# Patient Record
Sex: MALE | Race: WHITE
[De-identification: names, ages, dates, MRNs, and addresses within clinical notes are randomized per-mention and may not be internally consistent; named-entity substitution may affect disease eponyms.]

---

## 2018-04-10 ENCOUNTER — HOSPITAL ENCOUNTER (INPATIENT)
Dept: HOSPITAL 14 - H.ER | Age: 58
LOS: 3 days | Discharge: HOME | DRG: 810 | End: 2018-04-13
Attending: FAMILY MEDICINE | Admitting: FAMILY MEDICINE
Payer: MEDICAID

## 2018-04-10 VITALS — BODY MASS INDEX: 25.7 KG/M2

## 2018-04-10 DIAGNOSIS — I73.9: ICD-10-CM

## 2018-04-10 DIAGNOSIS — I61.0: Primary | ICD-10-CM

## 2018-04-10 DIAGNOSIS — I16.1: ICD-10-CM

## 2018-04-10 DIAGNOSIS — Z23: ICD-10-CM

## 2018-04-10 DIAGNOSIS — I16.0: ICD-10-CM

## 2018-04-10 DIAGNOSIS — K29.70: ICD-10-CM

## 2018-04-10 DIAGNOSIS — F20.9: ICD-10-CM

## 2018-04-10 DIAGNOSIS — R47.1: ICD-10-CM

## 2018-04-10 LAB
APTT BLD: 30.8 SECONDS (ref 25.6–37.1)
BASOPHILS # BLD AUTO: 0.1 K/UL (ref 0–0.2)
BASOPHILS NFR BLD: 2 % (ref 0–2)
BUN SERPL-MCNC: 15 MG/DL (ref 9–20)
CALCIUM SERPL-MCNC: 9 MG/DL (ref 8.4–10.2)
EOSINOPHIL # BLD AUTO: 0.1 K/UL (ref 0–0.7)
EOSINOPHIL NFR BLD: 2.3 % (ref 0–4)
ERYTHROCYTE [DISTWIDTH] IN BLOOD BY AUTOMATED COUNT: 13.7 % (ref 11.5–14.5)
GFR NON-AFRICAN AMERICAN: > 60
HDLC SERPL-MCNC: 27 MG/DL (ref 30–70)
HDLC SERPL-MCNC: 33 MG/DL (ref 30–70)
HGB BLD-MCNC: 12.9 G/DL (ref 12–18)
INR PPP: 1.1 (ref 0.9–1.2)
LDLC SERPL-MCNC: 70 MG/DL (ref 0–129)
LDLC SERPL-MCNC: 82 MG/DL (ref 0–129)
LYMPHOCYTES # BLD AUTO: 1.2 K/UL (ref 1–4.3)
LYMPHOCYTES NFR BLD AUTO: 20.6 % (ref 20–40)
MCH RBC QN AUTO: 28.6 PG (ref 27–31)
MCHC RBC AUTO-ENTMCNC: 33 G/DL (ref 33–37)
MCV RBC AUTO: 86.7 FL (ref 80–94)
MONOCYTES # BLD: 0.4 K/UL (ref 0–0.8)
MONOCYTES NFR BLD: 7.3 % (ref 0–10)
NEUTROPHILS # BLD: 4.1 K/UL (ref 1.8–7)
NEUTROPHILS NFR BLD AUTO: 67.8 % (ref 50–75)
NRBC BLD AUTO-RTO: 0 % (ref 0–0)
PLATELET # BLD: 123 K/UL (ref 130–400)
PMV BLD AUTO: 9.6 FL (ref 7.2–11.7)
PROTHROMBIN TIME: 12.7 SECONDS (ref 9.8–13.1)
RBC # BLD AUTO: 4.5 MIL/UL (ref 4.4–5.9)
WBC # BLD AUTO: 6 K/UL (ref 4.8–10.8)

## 2018-04-10 NOTE — CP.PCM.CON
History of Present Illness





- History of Present Illness


History of Present Illness: 





57yo M. PMHx HTN, schizophrenia, gastritis.  p/w 1 week of dysarthria.  Head CT 

show left basal ganglia ICH.





Review of Systems





- Review of Systems


All systems: reviewed and no additional remarkable complaints except





- Neurological


Neurological: Abnormal Speech





Past Patient History





- Past Social History


Alcohol: None


Drugs: Denies





- CARDIAC


Hx Cardiac Disorders: Yes





- ENDOCRINE/METABOLIC


Hx Endocrine Disorders: Yes





- GASTROINTESTINAL


Hx Gastritis: Yes





- PSYCHIATRIC


Hx Schizophrenia: Yes





Meds


Allergies/Adverse Reactions: 


 Allergies











Allergy/AdvReac Type Severity Reaction Status Date / Time


 


No Known Allergies Allergy   Verified 04/10/18 11:29














- Medications


Medications: 


 Current Medications





Nicardipine HCl (Cardene Iv Premix)  20 mg in 200 mls @ 50 mls/hr IV .Q4H ONE; 

5 MG/HR


   PRN Reason: Protocol


   Stop: 04/10/18 19:45


   Last Admin: 04/10/18 15:52 Dose:  50 mls/hr











Physical Exam





- Head Exam


Head Exam: ATRAUMATIC, NORMAL INSPECTION, NORMOCEPHALIC





- Eye Exam


Eye Exam: EOMI, Normal appearance, PERRL





- ENT Exam


ENT Exam: Mucous Membranes Moist, Normal Exam





- Neck Exam


Neck exam: Positive for: Normal Inspection





- Respiratory Exam


Respiratory Exam: Clear to Auscultation Bilateral, NORMAL BREATHING PATTERN





- Cardiovascular Exam


Cardiovascular Exam: REGULAR RHYTHM





- Neurological Exam


Neurological exam: Alert, CN II-XII Intact, Normal Gait, Oriented x3, Reflexes 

Normal


Additional comments: 





dysarthria





Results





- Vital Signs


Recent Vital Signs: 


 Last Vital Signs











Temp  97.8 F   04/10/18 15:21


 


Pulse  68   04/10/18 16:03


 


Resp  19   04/10/18 16:03


 


BP  146/106 H  04/10/18 16:03


 


Pulse Ox  98   04/10/18 15:55














- Labs


Result Diagrams: 


 04/10/18 12:22





 04/10/18 12:22


Labs: 


 Laboratory Results - last 24 hr











  04/10/18 04/10/18 04/10/18





  12:22 12:22 12:22


 


WBC   6.0 


 


RBC   4.50 


 


Hgb   12.9 


 


Hct   39.0 


 


MCV   86.7 


 


MCH   28.6 


 


MCHC   33.0 


 


RDW   13.7 


 


Plt Count   123 L 


 


MPV   9.6 


 


Neut % (Auto)   67.8 


 


Lymph % (Auto)   20.6 


 


Mono % (Auto)   7.3 


 


Eos % (Auto)   2.3 


 


Baso % (Auto)   2.0 


 


Neut # (Auto)   4.1 


 


Lymph # (Auto)   1.2 


 


Mono # (Auto)   0.4 


 


Eos # (Auto)   0.1 


 


Baso # (Auto)   0.1 


 


PT    12.7


 


INR    1.1


 


APTT    30.8


 


Sodium  142  


 


Potassium  3.8  


 


Chloride  98  


 


Carbon Dioxide  30  


 


Anion Gap  18  


 


BUN  15  


 


Creatinine  1.1  


 


Est GFR ( Amer)  > 60  


 


Est GFR (Non-Af Amer)  > 60  


 


Random Glucose  101  


 


Calcium  9.0  


 


Troponin I  < 0.0120  


 


Triglycerides  147  


 


Cholesterol  130  


 


LDL Cholesterol Direct  70  


 


HDL Cholesterol  27 L  


 


Alcohol, Quantitative  < 10  














Assessment & Plan


(1) ICH (intracerebral hemorrhage)


Assessment and Plan: 


57yo M. PMHx HTN, schizophrenia, gastritis.  p/w 1 week of dysarthria secondary 

to left basal ganglia ICH.





Neuro: only neurological symptom is dysarthria, neuro checks. repeat head ct in 

am.





Pulm: no acute issues, breathing spontaneously on room air





CV: blood pressure control, cardene gtt to maintain SBP<140





Hem: no acute issues





Renal: no acute issues, urine output wnl





Endo: no acute issues





GI: NPO, until dysphagia assessment.





ID: no acute issues





DVT proph - held with current bleed, SCD's


GI proph - not currently indicated


Code status - full code





Critical Care Time spent 35 minutes


Multi-disciplinary rounds were performed with house staff, nursing, speech 

therapy, respiratory therapy, pharmacy and nutrition with integrated input from 

the primary team/attending and other consulting services.  The documented time 

is cumulative and includes review of patient data/exams/labs/chart review and 

examination of the patient on rounds and throughout the day; time is exclusive 

of any procedures or teaching time.


Status: Acute

## 2018-04-10 NOTE — CP.PCM.PN
Subjective





- Date & Time of Evaluation


Date of Evaluation: 04/10/18


Time of Evaluation: 16:16





- Subjective


Subjective: 





called by ER MD 


small thalamic hemorrhage


not surgical lesion 


if anything changes reconsult





Objective





- Vital Signs/Intake and Output


Vital Signs (last 24 hours): 


 











Temp Pulse Resp BP Pulse Ox


 


 97.8 F   68   19   146/106 H  98 


 


 04/10/18 15:21  04/10/18 16:03  04/10/18 16:03  04/10/18 16:03  04/10/18 15:55











- Medications


Medications: 


 Current Medications





Nicardipine HCl (Cardene Iv Premix)  20 mg in 200 mls @ 50 mls/hr IV .Q4H ONE; 

5 MG/HR


   PRN Reason: Protocol


   Stop: 04/10/18 19:45


   Last Admin: 04/10/18 15:52 Dose:  50 mls/hr











- Labs


Labs: 


 





 04/10/18 12:22 





 04/10/18 12:22 





 











PT  12.7 Seconds (9.8-13.1)   04/10/18  12:22    


 


INR  1.1  (0.9-1.2)   04/10/18  12:22    


 


APTT  30.8 Seconds (25.6-37.1)   04/10/18  12:22

## 2018-04-10 NOTE — CT
PROCEDURE:  CT HEAD WITHOUT CONTRAST.



HISTORY:

dysarthria



COMPARISON:

Unenhanced head CT 11/01/2010 



TECHNIQUE:

Axial computed tomography images were obtained through the head/brain 

without intravenous contrast.  



Radiation dose:



Total exam DLP = 1092.22



MGy-cm.



This CT exam was performed using one or more of the following dose 

reduction techniques: Automated exposure control, adjustment of the 

mA and/or kV according to patient size, and/or use of iterative 

reconstruction technique.



FINDINGS:



HEMORRHAGE:

Small intraparenchymal hemorrhage is appreciate left basal ganglia 

with limited local edema.  The density is likely E subacute rather 

than acute in appearance overall.  Clinically correlate further.  

Subacute timeframe fits with the clinical history of dysarthria for 

almost 1 week. There is no significant mass effect although the 

hemorrhage does approach the lateral margins of the anterior body of 

the left lateral ventricle. Overall volume of the hemorrhagic 

collection is as follows: 2.0 x 1.4 x 2.6 cm (anteroposterior by 

transverse by superoinferior dimensions). 



BRAIN:

Otherwise, a moderate sized chronic lobar infarction is identified in 

the right frontal lobe with a chronic lacune identified the upper 

right basal ganglia posteriorly and possibly at the left thalamus 

also.  Further, white-matter appears lucent surrounding the lateral 

ventricles and also in the subcortical distribution potentially 

reflecting advanced chronic microangiopathy for a 58-year-old patient 

or other white-matter disorder. Corpus callosum does not appear 

affected.  Clinically correlate further. No suspicious extra-axial 

fluid collection is identified and imaging through the posterior 

fossa reflects an ectatic basilar artery and left vertebral artery 

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 

artery.



VENTRICLES:

Unremarkable. No hydrocephalus. 



CALVARIUM:

Unremarkable.



PARANASAL SINUSES:

Unremarkable as visualized. No significant inflammatory changes.



MASTOID AIR CELLS:

Unremarkable as visualized. No inflammatory changes.



OTHER FINDINGS:

None.



IMPRESSION:

1. For mild intraparenchymal hemorrhage identified the left basal 

ganglia measure 2.6 cm greatest dimension with limited local edema 

surrounding the periphery. The level of density suggests probable 

subacute timeframe.  Clinically correlate further. No significant 

mass effect this time.  Please see discussion above. 



2. Relatively prominent chronic microangiopathy type white-matter 

lucency pattern appear relatively advanced for age of the patient.  

Clinically correlate for potential additional white matter 

etiologies. 



3. Moderate from chronic lobar infarction. Chronic lacune upper right 

basal ganglia and possibly tiny lacune left thalamus.



Findings discussed with Dr. Guy 04/10/2018 1:35 p.m. with 

written down and read back verification.

## 2018-04-10 NOTE — ED PDOC
HPI: Neurologic





- General


Time Seen by Provider: 04/10/18 11:39


Chief Complaint (Nursing): Weakness/Neurological Deficit


Chief Complaint (Provider): Difficulty speaking


Source: patient


Exam Limitations: no limitations





- History of Present Illness


Timing/Duration: 1 week


Associated Symptoms: other (difficulty with speech).  denies: fever/chills, 

nausea/vomiting, numbness in legs/feet, weakness


Allergies/Adverse Reactions: 


Allergies





No Known Allergies Allergy (Verified 04/10/18 11:29)


 








Home Medications: 


Ambulatory Orders





Atenolol [Tenormin] 50 mg PO DAILY 09/08/15 


Diphenhydramine HCl [Diphenhydramine] 100 mg PO HS 09/08/15 


Valsartan/Hydrochlorothiazide [Valsartan and Hydrochlorothiazide 12.5 mg-80 ] 1 

tab PO DAILY 09/08/15 


Escitalopram [Lexapro] 20 mg PO DAILY 04/10/18 


Multivitamin [Multi-Vitamin Daily] 1 tab PO DAILY 04/10/18 


Olanzapine [Zyprexa] 15 mg PO HS 04/10/18 


Vitamin E [Vitamin E 400 Units Cap] 400 unit PO DAILY 04/10/18 








Additional Complaint(s): 





Jaden Paz is a 58 year old male, with a past medical history of HTN, 

gastritis and schizophrenia, who presents to the emergency department 

accompanied by family members for difficulty with speech onset for x1 week. 

Patient doesn't know and didn't think too much of symptoms but was prompted to 

the ED by family members. He denies any headache, weakness or numbness, gait 

problems, fever, chills, nausea, vomit, or diarrhea. No further medical 

complaints.





PMD: Katie Nuñez





NIHSS Stroke Scale





- Date/Time Evaluation Performed


Date Performed: 04/10/18


Time Performed: 11:40


When Was NIHSS Performed: 7-10 days





- How Severe is the Stroke


Level of Consciousness: 0=Alert


LOC to Questions: 0=Both comments correct


LOC to commands: 0=Obeys both correctly


Best Gaze: 0=Normal


Visual: 0=No visual loss


Facial: 1=Minor asymmetry


Motor Arm - Left: 0=No drift


Motor Arm - Right: 0=No drift


Motor Leg - Left: 0=No drift


Motor Leg - Right: 0=No drift


Limb Ataxia: 0=Absent


Sensory: 0=Normal


Best Language: 0=No aphasia


Dysarthia: 1=Mild to moderate slurring


Extinction & Inattention (Neglect): 0=Normal, no object


Score: 2





rTPA Inclusion/Exclusion





- Refusal of Treatment


Patient Refused Treatment: No





- Inclusion Criteria for Altepase


Patient is 18 years or Older: Yes


The Clinical Diagnosis of Ischemic Stroke That is Causing a Potentially 

Disabling Neurological Deficit: No


Time of Onset is Well Established to be Less Than 270 Minute Before Treatment 

Would Begin: No


Risk/Benefit Discussed With Patient/Family Member Present: No





- Exclusion Criteria for Altepase


Evidence of an Intracranial Hemorrhage: Yes





Past Medical History


Reviewed: Historical Data, Nursing Documentation, Vital Signs


Vital Signs: 





 Last Vital Signs











Temp  97.4 F L  04/10/18 11:22


 


Pulse  60   04/10/18 11:22


 


Resp  22   04/10/18 11:22


 


BP  171/101 H  04/10/18 11:22


 


Pulse Ox  98   04/10/18 11:22














- Medical History


PMH: Gastritis, HTN, Schizophrenia





- Surgical History


Surgical History: No Surg Hx





- Family History


Family History: States: No Known Family Hx





- Social History


Current smoker - smoking cessation education provided: No


Alcohol: None


Drugs: Denies





- Home Medications


Home Medications: 


 Ambulatory Orders











 Medication  Instructions  Recorded


 


Atenolol [Tenormin] 50 mg PO DAILY 09/08/15


 


Diphenhydramine HCl 100 mg PO HS 09/08/15





[Diphenhydramine]  


 


Valsartan/Hydrochlorothiazide 1 tab PO DAILY 09/08/15





[Valsartan and Hydrochlorothiazide  





12.5 mg-80 ]  


 


Escitalopram [Lexapro] 20 mg PO DAILY 04/10/18


 


Multivitamin [Multi-Vitamin Daily] 1 tab PO DAILY 04/10/18


 


Olanzapine [Zyprexa] 15 mg PO HS 04/10/18


 


Vitamin E [Vitamin E 400 Units Cap] 400 unit PO DAILY 04/10/18














- Allergies


Allergies/Adverse Reactions: 


 Allergies











Allergy/AdvReac Type Severity Reaction Status Date / Time


 


No Known Allergies Allergy   Verified 04/10/18 11:29














Review of Systems


ROS Statement: Except As Marked, All Systems Reviewed And Found Negative


Constitutional: Negative for: Fever, Chills


Gastrointestinal: Negative for: Nausea, Vomiting, Diarrhea


Neurological: Positive for: Change in Speech (difficulty with speech).  

Negative for: Weakness, Numbness, Headache, Other (gait problems)





Physical Exam





- Reviewed


Nursing Documentation Reviewed: Yes


Vital Signs Reviewed: Yes





- Physical Exam


Appears: Positive for: Non-toxic, No Acute Distress


Head Exam: Positive for: ATRAUMATIC, NORMAL INSPECTION, NORMOCEPHALIC


Skin: Positive for: Normal Color, Warm, Dry


Eye Exam: Positive for: Normal appearance, EOMI, PERRL


ENT: Positive for: Normal ENT Inspection


Neck: Positive for: Painless ROM, Supple


Cardiovascular/Chest: Positive for: Regular Rate, Rhythm.  Negative for: Murmur


Respiratory: Positive for: Normal Breath Sounds.  Negative for: Respiratory 

Distress


Gastrointestinal/Abdominal: Positive for: Normal Exam, Soft.  Negative for: 

Tenderness, Guarding, Rebound


Extremity: Positive for: Normal ROM (upper and lower extremities.).  Negative 

for: Tenderness, Deformity, Swelling


Neurologic/Psych: Positive for: Alert, CNs II-XII (facial assymetry), Oriented (

x3), Gait (steady), Facial Droop (mild ).  Negative for: Motor/Sensory Deficits 

(normal on arms and legs)





- Laboratory Results


Result Diagrams: 


 04/11/18 04:45





 04/11/18 04:45





- ECG


O2 Sat by Pulse Oximetry: 98 (RA)


Pulse Ox Interpretation: Normal





- Critical Care


Total Time (In Min): 30





Medical Decision Making


Medical Decision Making: 





Initial Impression: Dysarthria, CVA, hemorrhagic CVA. 





Initial Plan: Stroke protocol work up





--Head w/o Contrast [CT]


--EKG


--Alcohol serum


--BMP


--Drug screen, urine


--Lipid Panel


--Troponin I


--CBC w/ differential


--PTT


--PT


--Reevaluation





13:49


Head CT





FINDINGS:





HEMORRHAGE:


Small intraparenchymal hemorrhage is appreciate left basal ganglia with limited 

local edema.  The density is likely E subacute rather than acute in appearance 

overall.  Clinically correlate further.  Subacute timeframe fits with the 

clinical history of dysarthria for almost 1 week. There is no significant mass 

effect although the hemorrhage does approach the lateral margins of the 

anterior body of the left lateral ventricle. Overall volume of the hemorrhagic 

collection is as follows: 2.0 x 1.4 x 2.6 cm (anteroposterior by transverse by 

superoinferior dimensions). 





BRAIN:


Otherwise, a moderate sized chronic lobar infarction is identified in the right 

frontal lobe with a chronic lacune identified the upper right basal ganglia 

posteriorly and possibly at the left thalamus also.  Further, white-matter 

appears lucent surrounding the lateral ventricles and also in the subcortical 

distribution potentially reflecting advanced chronic microangiopathy for a 58-

year-old patient or other white-matter disorder. Corpus callosum does not 

appear affected.  Clinically correlate further. No suspicious extra-axial fluid 

collection is identified and imaging through the posterior fossa reflects an 

ectatic basilar artery and left vertebral artery once again with likely 

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





VENTRICLES:


Unremarkable. No hydrocephalus. 





CALVARIUM:


Unremarkable.





PARANASAL SINUSES:


Unremarkable as visualized. No significant inflammatory changes.





MASTOID AIR CELLS:


Unremarkable as visualized. No inflammatory changes.





OTHER FINDINGS:


None.





IMPRESSION:


1. For mild intraparenchymal hemorrhage identified the left basal ganglia 

measure 2.6 cm greatest dimension with limited local edema surrounding the 

periphery. The level of density suggests probable subacute timeframe.  

Clinically correlate further. No significant mass effect this time.  Please see 

discussion above. 





2. Relatively prominent chronic microangiopathy type white-matter lucency 

pattern appear relatively advanced for age of the patient.  Clinically 

correlate for potential additional white matter etiologies. 





3. Moderate from chronic lobar infarction. Chronic lacune upper right basal 

ganglia and possibly tiny lacune left thalamus.





Findings discussed with Dr. Guy 04/10/2018 1:35 p.m. with written down and 

read back verification.











-Spoke with Dr. Guerrier for admission and wants Dr. Branch from neurology and 

will also consult with neurosurgeon.








-Spoke with Dr. Branch who advised to control the HTN with Nicardipine. TPA in 

contraindicated and not a candidate as well. 


ASA is contraindicated due to ICH.





-Discussed with Dr. Gary who states no neurosurgery treatment or 

evaluation is indicated for this patient. 








15:40


-Spoke with intensivist, Dr. Gautam, who accepted patient to ICU for 

uncontrolled blood pressure. 





--------------------------------------------------------------------------------

-----------------


Scribe Attestation:


Documented by Chicho lAmanza, acting as a scribe for Fazal Guy PA-C





Provider Scribe Attestation:


All medical record entries made by the Scribe were at my direction and 

personally dictated by me. I have reviewed the chart and agree that the record 

accurately reflects my personal performance of the history, physical exam, 

medical decision making, and the department course for this patient. I have 

also personally directed, reviewed, and agree with the discharge instructions 

and disposition.





Disposition





- Clinical Impression


Clinical Impression: 


 ICH (intracerebral hemorrhage)








- Patient ED Disposition


Is Patient to be Admitted: Yes


Discussed With : David Guerrier


Doctor Will See Patient In The: Hospital





- Disposition


Disposition Time: 14:45


Condition: GUARDED





- Pt Status Changed To:


Hospital Disposition Of: Inpatient





- Admit Certification


Admit to Inpatient:: After my assessment, the patient will require 

hospitalization for at least two midnights.  This is because of the severity of 

symptoms shown, intensity of services needed, and/or the medical risk in this 

patient being treated as an outpatient.





- POA


Present On Arrival: None

## 2018-04-11 LAB
ALBUMIN SERPL-MCNC: 3.9 G/DL (ref 3.5–5)
ALBUMIN/GLOB SERPL: 1.2 {RATIO} (ref 1–2.1)
ALT SERPL-CCNC: 32 U/L (ref 21–72)
AST SERPL-CCNC: 24 U/L (ref 17–59)
BUN SERPL-MCNC: 17 MG/DL (ref 9–20)
CALCIUM SERPL-MCNC: 9 MG/DL (ref 8.4–10.2)
ERYTHROCYTE [DISTWIDTH] IN BLOOD BY AUTOMATED COUNT: 13.7 % (ref 11.5–14.5)
GFR NON-AFRICAN AMERICAN: > 60
HGB BLD-MCNC: 13.6 G/DL (ref 12–18)
MCH RBC QN AUTO: 28.4 PG (ref 27–31)
MCHC RBC AUTO-ENTMCNC: 32.6 G/DL (ref 33–37)
MCV RBC AUTO: 87.1 FL (ref 80–94)
PLATELET # BLD: 124 K/UL (ref 130–400)
RBC # BLD AUTO: 4.79 MIL/UL (ref 4.4–5.9)
WBC # BLD AUTO: 6.9 K/UL (ref 4.8–10.8)

## 2018-04-11 PROCEDURE — 3E0234Z INTRODUCTION OF SERUM, TOXOID AND VACCINE INTO MUSCLE, PERCUTANEOUS APPROACH: ICD-10-PCS | Performed by: FAMILY MEDICINE

## 2018-04-11 NOTE — CP.PCM.HP
<Dhiraj Atkins - Last Filed: 04/11/18 12:44>





History of Present Illness





- History of Present Illness


History of Present Illness: 





CC: slurred speech





HPI: 59 y/o man w/ pmh of HTN, gastritis, and schizophrenia, presents to the ED 

w/ sluured speech.  Patient brought by family members for difficulty with 

speech onset for x1 week.  Patient unaware of slurring speech but family was 

concerned.  Patient denies headache, weakness or numbness, gait problems, fever

, chills, chest pain, SOB, abdominal pain, nausea, vomiting, diarrhea, or 

dysuria.  The patient has no further medical complaints.  Mother at bedside 

reports that son's face may be asymmetrical at baseline due to poor dentition.





PMD: Dr. Katie Nuñez


PMH: HTN, gastritis, and schizophrenia


meds: see med list


Allergies: NKDA


PSH: denies


Fam: denies


SOC: denies smoking, alcohol, and drugs





ROS: 12 points assessed and negative unless otherwise reported in HPI








Present on Admission





- Present on Admission


Any Indicators Present on Admission: No


History of DVT/PE: No


History of Uncontrolled Diabetes: No


Urinary Catheter: No


Decubitus Ulcer Present: No





Review of Systems





- Review of Systems


All systems: reviewed and no additional remarkable complaints except





- Constitutional


Constitutional: absent: Chills, Fever, Headache





- EENT


Eyes: absent: Change in Vision





- Cardiovascular


Cardiovascular: absent: Chest Pain, Palpitations





- Respiratory


Respiratory: absent: Cough, Dyspnea, Hemoptysis, Wheezing





- Gastrointestinal


Gastrointestinal: absent: Abdominal Pain, Diarrhea, Nausea, Vomiting





- Genitourinary


Genitourinary: absent: Dysuria





- Integumentary


Integumentary: absent: Rash





Past Patient History





- Past Social History


Alcohol: None


Drugs: Denies





- CARDIAC


Hx Cardiac Disorders: Yes





- NEUROLOGICAL


Other/Comment: schizophrenia





- ENDOCRINE/METABOLIC


Hx Endocrine Disorders: Yes





- MUSCULOSKELETAL/RHEUMATOLOGICAL


Hx Falls: No





- GASTROINTESTINAL


Hx Gastritis: Yes





- PSYCHIATRIC


Hx Schizophrenia: Yes





Meds


Allergies/Adverse Reactions: 


 Allergies











Allergy/AdvReac Type Severity Reaction Status Date / Time


 


No Known Allergies Allergy   Verified 04/10/18 11:29














Physical Exam





- Constitutional


Appears: Non-toxic, No Acute Distress





- Head Exam


Head Exam: ATRAUMATIC, NORMAL INSPECTION, NORMOCEPHALIC





- Eye Exam


Eye Exam: EOMI, Normal appearance, PERRL





- ENT Exam


ENT Exam: Mucous Membranes Moist





- Neck Exam


Neck exam: Positive for: Full Rom.  Negative for: Tenderness





- Respiratory Exam


Respiratory Exam: Clear to Auscultation Bilateral.  absent: Accessory Muscle Use

, Decreased Breath Sounds, Rales, Rhonchi, Wheezes, Respiratory Distress





- Cardiovascular Exam


Cardiovascular Exam: REGULAR RHYTHM.  absent: Tachycardia





- GI/Abdominal Exam


GI & Abdominal Exam: Normal Bowel Sounds, Soft.  absent: Distended, Tenderness





- Extremities Exam


Extremities exam: Negative for: calf tenderness, pedal edema, tenderness





- Neurological Exam


Neurological exam: Alert, CN II-XII Intact, Oriented x3





- Skin


Skin Exam: Dry, Intact, Normal Color, Warm





Results





- Vital Signs


Recent Vital Signs: 





 Last Vital Signs











Temp  98.7 F   04/11/18 12:00


 


Pulse  75   04/11/18 12:00


 


Resp  30 H  04/11/18 12:00


 


BP  119/82   04/11/18 12:00


 


Pulse Ox  97   04/11/18 12:00














- Labs


Result Diagrams: 


 04/11/18 04:45





 04/11/18 04:45


Labs: 





 Laboratory Results - last 24 hr











  04/10/18 04/10/18 04/11/18





  12:22 19:00 04:45


 


WBC    6.9


 


RBC    4.79


 


Hgb    13.6


 


Hct    41.7


 


MCV    87.1


 


MCH    28.4


 


MCHC    32.6 L


 


RDW    13.7


 


Plt Count    124 L


 


Sodium  142  


 


Potassium  3.8  


 


Chloride  98  


 


Carbon Dioxide  30  


 


Anion Gap  18  


 


BUN  15  


 


Creatinine  1.1  


 


Est GFR ( Amer)  > 60  


 


Est GFR (Non-Af Amer)  > 60  


 


Random Glucose  101  


 


Calcium  9.0  


 


Total Bilirubin   


 


AST   


 


ALT   


 


Alkaline Phosphatase   


 


Troponin I  < 0.0120  


 


Total Protein   


 


Albumin   


 


Globulin   


 


Albumin/Globulin Ratio   


 


Triglycerides  147  83  D 


 


Cholesterol  130  146 


 


LDL Cholesterol Direct  70  82 


 


HDL Cholesterol  27 L  33 


 


Alcohol, Quantitative  < 10  














  04/11/18





  04:45


 


WBC 


 


RBC 


 


Hgb 


 


Hct 


 


MCV 


 


MCH 


 


MCHC 


 


RDW 


 


Plt Count 


 


Sodium  146


 


Potassium  3.3 L


 


Chloride  104


 


Carbon Dioxide  26


 


Anion Gap  19


 


BUN  17


 


Creatinine  0.9


 


Est GFR ( Amer)  > 60


 


Est GFR (Non-Af Amer)  > 60


 


Random Glucose  98


 


Calcium  9.0


 


Total Bilirubin  1.2


 


AST  24


 


ALT  32


 


Alkaline Phosphatase  53


 


Troponin I 


 


Total Protein  7.1


 


Albumin  3.9


 


Globulin  3.3


 


Albumin/Globulin Ratio  1.2


 


Triglycerides 


 


Cholesterol 


 


LDL Cholesterol Direct 


 


HDL Cholesterol 


 


Alcohol, Quantitative 














Assessment & Plan


(1) ICH (intracerebral hemorrhage)


Status: Acute   





(2) HTN (hypertension)


Status: Chronic   





- Assessment and Plan (Free Text)


Plan: 





c/w present management


afebrile, non-tachycardic, normotensive


neurology recommendations appreciated; no antiplatelets for 3 weeks, can start 

baby aspirin in 3 weeks


neuro-surgery recommendations appreciated


cardiology consult ordered


EKG: sinus bradycardia, T wave depression/flattenning on anterolateral leads


CT head w/o 04/10/2018: showed left basal ganglia intracranial hemorrhage w/ 

chronic lacunar infarct


repeat Ct head w/o: stable left basal ganglia intracranial hemorrhage


MRA head w/o contrast: intact [place], there is hypoplastic distal 

right vertebral artery with widely patent ectatic distal left vertebral artery


f/u echo w/ bubble study


f/u speech/swallow eval


PT/OT ordered


monitor for acute changes





<David Guerrier - Last Filed: 04/11/18 21:33>





Results





- Vital Signs


Recent Vital Signs: 





 Last Vital Signs











Temp  98.2 F   04/11/18 20:00


 


Pulse  79   04/11/18 20:00


 


Resp  19   04/11/18 20:00


 


BP  140/68   04/11/18 20:00


 


Pulse Ox  97   04/11/18 20:00














- Labs


Result Diagrams: 


 04/11/18 04:45





 04/11/18 04:45


Labs: 





 Laboratory Results - last 24 hr











  04/11/18 04/11/18





  04:45 04:45


 


WBC  6.9 


 


RBC  4.79 


 


Hgb  13.6 


 


Hct  41.7 


 


MCV  87.1 


 


MCH  28.4 


 


MCHC  32.6 L 


 


RDW  13.7 


 


Plt Count  124 L 


 


Sodium   146


 


Potassium   3.3 L


 


Chloride   104


 


Carbon Dioxide   26


 


Anion Gap   19


 


BUN   17


 


Creatinine   0.9


 


Est GFR ( Amer)   > 60


 


Est GFR (Non-Af Amer)   > 60


 


Random Glucose   98


 


Calcium   9.0


 


Total Bilirubin   1.2


 


AST   24


 


ALT   32


 


Alkaline Phosphatase   53


 


Total Protein   7.1


 


Albumin   3.9


 


Globulin   3.3


 


Albumin/Globulin Ratio   1.2














Assessment & Plan





- Assessment and Plan (Free Text)


Plan: 


I was present during evaluation and discussed with Dr Atkins re plans of care 

and mgt.





David Guerrier M.D.

## 2018-04-11 NOTE — CT
PROCEDURE:  CT HEAD WITHOUT CONTRAST.



HISTORY:

ich



COMPARISON:

None available. 



TECHNIQUE:

Axial computed tomography images were obtained through the head/brain 

without intravenous contrast.  



Radiation dose:



Total exam DLP = 991.27 mGy-cm.



This CT exam was performed using one or more of the following dose 

reduction techniques: Automated exposure control, adjustment of the 

mA and/or kV according to patient size, and/or use of iterative 

reconstruction technique.



FINDINGS:



HEMORRHAGE:

Stable posterior left basal ganglia intraparenchymal hemorrhage is 

appreciated measuring 2.0 x 1.9 x 2.3 cm with local edema but no 

significant mass effect once again. No interval new hemorrhagic 

location is appreciated above or below the tentorium including the 

extra-axial spaces. 



BRAIN:

Chronic cystic encephalomalacia at the right frontal lobe is 

reiterated as well as diffuse cerebral white matter changes 

suggestive of chronic microangiopathy and left caudate head chronic 

lacune. No cortical edema throughout with brainstem and cerebellum 

remaining unremarkable diffusely. 



VENTRICLES:

Unremarkable. No hydrocephalus. 



CALVARIUM:

Unremarkable.



PARANASAL SINUSES:

Unremarkable as visualized. No significant inflammatory changes.



MASTOID AIR CELLS:

Unremarkable as visualized. No inflammatory changes.



OTHER FINDINGS:

None.



IMPRESSION:

Stable left basal ganglia intraparenchymal hemorrhage including local 

edema.  No interval mass effect is appreciated or new site of 

intracranial hemorrhage.  Chronic in cystic encephalomalacia right 

frontal lobe is reiterated as well as a chronic lacune at the 

anterior left basal ganglia. Likely a relatively prominent chronic 

microangiopathy reiterated.

## 2018-04-11 NOTE — CP.CCUPN
CCU Subjective





- Physician Review


Events Since Last Encounter (Free Text): 





04/11/18 13:52


speech still dysarthric.





CCU Objective





- Vital Signs / Intake & Output


Vital Signs (Last 4 hours): 


Vital Signs











  Temp Pulse Resp BP Pulse Ox


 


 04/11/18 12:00  98.7 F  75  30 H  119/82  97


 


 04/11/18 10:00   70  24  124/78  98











Intake and Output (Last 8hrs): 


 Intake & Output











 04/10/18 04/11/18 04/11/18





 22:59 06:59 14:59


 


Intake Total 295 50 


 


Output Total 225  


 


Balance 70 50 


 


Intake:   


 


   50 


 


Output:   


 


  Urine 225  


 


    Urine, Voided 225  














- Physical Exam


Head: Positive for: Atraumatic, Normocephalic


Pupils: Positive for: PERRL


Extroacular Muscles: Positive for: EOMI


Mouth: Positive for: Moist Mucous Membranes


Neck: Positive for: Normal Range of Motion


Respiratory/Chest: Positive for: Clear to Auscultation, Good Air Exchange


Cardiovascular: Positive for: Regular Rate and Rhythm


Abdomen: Positive for: Normal Bowel Sounds.  Negative for: Tenderness, 

Distention


Neurological: Positive for: GCS=15, Other (dysarthria)


Psychiatric: Positive for: Alert, Oriented x 3





- Medications


Active Medications: 


Active Medications











Generic Name Dose Route Start Last Admin





  Trade Name Freq  PRN Reason Stop Dose Admin


 


Pantoprazole Sodium  40 mg  04/11/18 09:00  04/11/18 08:53





  Protonix Inj  IVP   40 mg





  DAILY RACHEL   Administration


 


Valsartan  160 mg  04/11/18 13:45  





  Diovan  PO   





  DAILY RACHEL   














- Patient Studies


Lab Studies: 


 Lab Studies











  04/11/18 04/11/18 04/10/18 Range/Units





  04:45 04:45 19:00 


 


WBC   6.9   (4.8-10.8)  K/uL


 


RBC   4.79   (4.40-5.90)  Mil/uL


 


Hgb   13.6   (12.0-18.0)  g/dL


 


Hct   41.7   (35.0-51.0)  %


 


MCV   87.1   (80.0-94.0)  fl


 


MCH   28.4   (27.0-31.0)  pg


 


MCHC   32.6 L   (33.0-37.0)  g/dL


 


RDW   13.7   (11.5-14.5)  %


 


Plt Count   124 L   (130-400)  K/uL


 


Sodium  146    (132-148)  mmol/l


 


Potassium  3.3 L    (3.6-5.0)  MMOL/L


 


Chloride  104    ()  mmol/L


 


Carbon Dioxide  26    (22-30)  mmol/L


 


Anion Gap  19    (10-20)  


 


BUN  17    (9-20)  mg/dl


 


Creatinine  0.9    (0.8-1.5)  mg/dl


 


Est GFR ([place] Amer)  > 60    


 


Est GFR (Non-Af Amer)  > 60    


 


Random Glucose  98    ()  mg/dL


 


Calcium  9.0    (8.4-10.2)  mg/dL


 


Total Bilirubin  1.2    (0.2-1.3)  mg/dl


 


AST  24    (17-59)  U/L


 


ALT  32    (21-72)  U/L


 


Alkaline Phosphatase  53    ()  U/L


 


Total Protein  7.1    (6.3-8.2)  G/DL


 


Albumin  3.9    (3.5-5.0)  g/dL


 


Globulin  3.3    (2.2-3.9)  gm/dL


 


Albumin/Globulin Ratio  1.2    (1.0-2.1)  


 


Triglycerides    83  D  (0-149)  mg/DL


 


Cholesterol    146  (0-199)  mg/dL


 


LDL Cholesterol Direct    82  (0-129)  mg/dL


 


HDL Cholesterol    33  (30-70)  MG/DL








 Laboratory Results - last 24 hr











  04/10/18 04/11/18 04/11/18





  19:00 04:45 04:45


 


WBC   6.9 


 


RBC   4.79 


 


Hgb   13.6 


 


Hct   41.7 


 


MCV   87.1 


 


MCH   28.4 


 


MCHC   32.6 L 


 


RDW   13.7 


 


Plt Count   124 L 


 


Sodium    146


 


Potassium    3.3 L


 


Chloride    104


 


Carbon Dioxide    26


 


Anion Gap    19


 


BUN    17


 


Creatinine    0.9


 


Est GFR ( Amer)    > 60


 


Est GFR (Non-Af Amer)    > 60


 


Random Glucose    98


 


Calcium    9.0


 


Total Bilirubin    1.2


 


AST    24


 


ALT    32


 


Alkaline Phosphatase    53


 


Total Protein    7.1


 


Albumin    3.9


 


Globulin    3.3


 


Albumin/Globulin Ratio    1.2


 


Triglycerides  83  D  


 


Cholesterol  146  


 


LDL Cholesterol Direct  82  


 


HDL Cholesterol  33  














Review of Systems





- Review of Systems


All systems: reviewed and no additional remarkable complaints except (no 

complaints)





Critical Care Progress Note





- Nutrition


Nutrition: 


 Nutrition











 Category Date Time Status


 


 Heart Healthy Diet [DIET] Diets  04/11/18 Lunch Active














Assessment/Plan


(1) ICH (intracerebral hemorrhage)


Assessment and plan: 


59yo M. PMHx HTN, schizophrenia, gastritis.  p/w 1 week of dysarthria secondary 

to left basal ganglia ICH.





Neuro: only neurological symptom is dysarthria, neuro checks. repeat head ct 

showed no change.





Pulm: no acute issues, breathing spontaneously on room air





CV: blood pressure control, stopped cardene gtt, started back on oral anti-

hypertensives, higher dose valsartan 160mg.





Hem: no acute issues





Renal: no acute issues, urine output wnl





Endo: no acute issues





GI: low sodium diet





ID: no acute issues





DVT proph - held with current bleed, SCD's, patient is ambulatory


GI proph - not currently indicated


Code status - full code





Patient is stable for downgrade to the telemetry floors.





Critical Care Time spent 35 minutes


Multi-disciplinary rounds were performed with house staff, nursing, speech 

therapy, respiratory therapy, pharmacy and nutrition with integrated input from 

the primary team/attending and other consulting services.  The documented time 

is cumulative and includes review of patient data/exams/labs/chart review and 

examination of the patient on rounds and throughout the day; time is exclusive 

of any procedures or teaching time.


Current Visit: Yes   Status: Acute

## 2018-04-11 NOTE — PN
NEUROLOGY FOLLOWUP



DATE:  04/11/2018



CHIEF COMPLAINT:  Followup for status post intraparenchymal hemorrhage and

dysarthrias.



SUBJECTIVE:  The patient was seen and examined at bedside.  Still has some

dysarthria.  His MRI of the brain shows stable intraparenchymal basal

ganglia hemorrhage.  No significant mass effect.  Had some mild angiopathy

also seen.  His MRI of the head was unremarkable.  Currently, he is doing

much better.  His blood pressure medications have been adjusted _____ and

no acute events overnight.



PAST MEDICAL HISTORY:  Hypertension, gastritis and schizophrenia.



MEDICATIONS:  Reviewed by nurse per reconciliation sheet.



REVIEW OF SYSTEMS:  A 14-point review of systems is negative except in the

HPI.



FAMILY HISTORY:  Noncontributory.



PHYSICAL EXAMINATION

VITAL SIGNS:  Temperature 98.7, pulse rate 75, blood pressure 132/97,

respiratory rate 20 and oxygen saturations 96% by room air.

GENERAL:  The patient is sitting up in bed.  No acute distress.

HEENT:  Atraumatic and normocephalic.  PERRLA.  Extraocular muscles intact.

NECK:  Supple.  No JVD.  No adenopathy noted.

LUNGS:  Clear to auscultation.  No adventitious sounds.

HEART:  S1 and S2, normal rate and rhythm.  No murmurs, rubs or gallops.

ABDOMEN:  Soft, nontender and nondistended.  Bowel sounds present.

EXTREMITIES:  No clubbing.  No cyanosis.  Peripheral pulses 2+ bilaterally.

NEUROLOGIC:  The patient is alert and oriented to person, place, month, and

year.  Recall after 5 minutes is 0/3.  Poor attention span.  Slow thought

process.  Speech is dysarthric.  Cranial nerves II to XII intact.  Had some

mild right facial droop.  Motor exam; mild right-sided weakness with mild

right pronator drip when compared to the left.  Motor strength is intact. 

Sensory exam: light touch pinprick, proprioception, and vibration are

intact.  DTRs are 2+ throughout.  Coordination, finger-to-nose intact. 

There is some mild dysmetria on the finger-to-nose on the right due to mild

right-sided weakness.  Gait is deferred for now.



LABORATORY DATA:  Reviewed.



ASSESSMENT AND PLAN:  This is a 58-year-old man with history of

schizophrenia, hypertension, came in for dysarthria for past several week

with mild right-sided weakness and right facial droop and found to have

elevated systolic and diastolic blood pressures and found to have a left

intraparenchymal left basal ganglia hemorrhage which is secondary to

hypertensive urgency.



RECOMMENDATIONS:  At this time recommend;

1.  Keep blood pressure systolically to 120s to 130s and diastolic 70s and

80s.

2.  No anticoagulation for 3 weeks in the onset of hemorrhage.

3.  PT/OT assessment.  Continue on current present medical management.



Thank you for this followup.





__________________________________________

Jef Branch MD





DD:  04/11/2018 17:21:25

DT:  04/11/2018 18:03:53

Job # [id number]

## 2018-04-11 NOTE — MRI
PROCEDURE:  MRI BRAIN WITHOUT CONTRAST



HISTORY:

iCH



COMPARISON:

Unenhanced head CT 04/10/2018. 



TECHNIQUE:

Multiplanar, multisequence MR images of the brain were obtained 

without intravenous contrast enhancement.



FINDINGS:



BRAIN PARENCHYMA:

Left basal ganglia intraparenchymal hemorrhage is reiterated with 

likely extension to the subependymal margins at the left lateral 

ventricle as no layering of hemorrhage and anteriorly as seen in the 

left lateral ventricle body or atrium at this time. The overall 

pattern does not appear to have increased in size in the interval 

measuring a maximum of 2.3 cm greatest dimension which may not 

adequately translate to the CT pattern given differences and imaging 

technique. No prominent interval intracranial hemorrhage is 

identified and there remains no significant mass effect due to the 

acute or subacute hemorrhage.



However, there are multiple small foci of hemosiderin dephasing 

signal in the gradient echo axial series scattered throughout the 

cerebellum, also including the brainstem. There not predominate 

peripheral though number located in the periphery of the bilateral 

frontal parietal and temporal lobes, but also affect central brain 

including the bilateral thalami. No calcifications were seen 

associated with these foci in the prior CT examination and the 

pattern is felt to be a function of multiple cavernomata rather than 

amyloid angiopathy or neurocysticercosis.



Posttraumatic or post ischemic cystic encephalomalacia is reiterated 

at the right frontal lobe with relatively prominent subcortical and 

periventricular white matter changes sparing the corpus callosum in a 

pattern suggestive of likely chronic microangiopathy.  Minimal 

involvement of the jessenia is appreciated. The sulci and cisterns are 

unremarkable swells overall ventricular volume.



VENTRICLES:

Unremarkable. No hydrocephalus.



CRANIUM:

Unremarkable.



ORBITS:

Grossly unremarkable.



PARANASAL SINUSES/MASTOIDS:

Clear



VASCULAR SYSTEM:

Skull base flow voids intact.



OTHER FINDINGS:

None. 



IMPRESSION:

Likely stable intraparenchymal hemorrhage left basal ganglia with 

local edema but no significant mass effect at this time. Follow-up CT 

advised. 



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 

and brainstem and is favored over amyloid angiopathy or even 

neurocysticercosis. Posttraumatic changes from diffuse axonal injury 

is also not favored. Chronic cystic encephalomalacia right frontal 

lobe reiterated. 



Relatively prominent white matter changes suggestive of advanced 

chronic microangiopathy.  Clinically correlate nevertheless.

## 2018-04-11 NOTE — MRI
PROCEDURE:  Magnetic Resonance Angiography Brain



HISTORY:

ICH







COMPARISON:

None available. 



TECHNIQUE:

3D time of flight MR angiography of the intracranial arteries was 

performed. Rotating maximum intensity projection images were 

generated.



FINDINGS:



INTERNAL CAROTID ARTERIES:

Unremarkable. The skull base, petrous, cavernous and supraclinoid 

segments are bilaterally widely patient. 



ANTERIOR CEREBRAL ARTERIES:

Unremarkable. A1 and A2 segments are widely patent. Smaller distal 

branches unremarkable, as visualized.



MIDDLE CEREBRAL ARTERIES:

Unremarkable. M1 and M2 segments are widely patent. Perisylvian 

branches grossly symmetric.



POSTERIOR CIRCULATION:

Basilar Artery: Unremarkable.



Distal Vertebral Arteries: The distal right vertebral artery is 

likely markedly hypoplastic with a widely patent though ectatic 

distal left vertebral artery identified.



Posterior Cerebral Arteries: Unremarkable.



Posterior Inferior Cerebellar Arteries: Unremarkable.



ANEURYSM/ VASCULAR MALFORMATIONS:

None.



OTHER FINDINGS:

None. 



IMPRESSION:

Intact [place] major arteries as discussed above. No returns 

malformation or aneurysm or significant stenosis is identified.  Note 

is made of a likely markedly hypoplastic distal right vertebral 

artery with a widely patent though ectatic distal left vertebral 

artery evident.

## 2018-04-11 NOTE — CP.PCM.CON
History of Present Illness





- History of Present Illness


History of Present Illness: 





consulation for evaluation of cardioembolic source of CVA 





HPI:


58-year-old male with history of history of schizophrenia hypertension 

gastritis brought in by family member for evaluation of slurred speech going on 

for a week patient's mother and uncle at the bedside who gave most of the 

information as per the family member he has been having difficulty for speech 

for about a week for which family was concerned and brought him to the 

emergency room according to patient's mother he was okay until he and his 

teenage years when he went for a retraining and then since then had a left 

severe psychiatric disorders and schizophrenia and has been dependent on his 

family members at baseline he has limited activity but could have articulated 

and will verbalize his feelings but for the past week was unable to talk.  

Mother at bedside reports that son face is also noted to be asymmetrical due to 

poor dentition.  Past medical history as stated above significant for 

hypertension schizophrenia gastritis allergies no known drug allergies family 

history reviewed denied any history of premature CAD or early death social 

history denies history of smoking alcohol or illicit drug use.





Review of Systems





- Review of Systems


Systems not reviewed;Unavailable: Acuity of Condition





- Constitutional


Constitutional: As Per HPI





- EENT


Eyes: As Per HPI


Ears: As Per HPI


Nose/Mouth/Throat: As Per HPI





- Cardiovascular


Cardiovascular: As Per HPI





- Respiratory


Respiratory: As Per HPI





- Gastrointestinal


Gastrointestinal: As Per HPI





- Genitourinary


Genitourinary: As Per HPI





- Reproductive: Male


Reproductive:Male: As Per HPI





- Musculoskeletal


Musculoskeletal: As Per HPI





- Integumentary


Integumentary: As Per HPI





- Neurological


Neurological: As Per HPI





- Psychiatric


Psychiatric: As Per HPI





- Endocrine


Endocrine: As Per HPI





- Hematologic/Lymphatic


Hematologic: As Per HPI





Past Patient History





- Past Social History


Alcohol: None


Drugs: Denies





- CARDIAC


Hx Hypertension: Yes





- NEUROLOGICAL


Other/Comment: schizophrenia





- ENDOCRINE/METABOLIC


Hx Endocrine Disorders: Yes





- MUSCULOSKELETAL/RHEUMATOLOGICAL


Hx Falls: No





- GASTROINTESTINAL


Hx Gastritis: Yes





- PSYCHIATRIC


Hx Schizophrenia: Yes





Meds


Allergies/Adverse Reactions: 


 Allergies











Allergy/AdvReac Type Severity Reaction Status Date / Time


 


No Known Allergies Allergy   Verified 04/10/18 11:29














- Medications


Medications: 


 Current Medications





Pantoprazole Sodium (Protonix Inj)  40 mg IVP DAILY [facility]


   Last Admin: 04/11/18 08:53 Dose:  40 mg


Valsartan (Diovan)  160 mg PO DAILY [facility]


   Last Admin: 04/11/18 16:02 Dose:  160 mg











Physical Exam





- Constitutional


Appears: Well





- Head Exam


Head Exam: ATRAUMATIC, NORMAL INSPECTION, NORMOCEPHALIC





- Eye Exam


Eye Exam: EOMI, Normal appearance, PERRL


Pupil Exam: NORMAL ACCOMODATION, PERRL





- ENT Exam


ENT Exam: Mucous Membranes Moist, Normal Exam





- Neck Exam


Neck exam: Positive for: Normal Inspection





- Respiratory Exam


Respiratory Exam: Clear to Auscultation Bilateral, NORMAL BREATHING PATTERN





- Cardiovascular Exam


Cardiovascular Exam: REGULAR RHYTHM, RRR, +S1, +S2





- GI/Abdominal Exam


GI & Abdominal Exam: Normal Bowel Sounds, Soft.  absent: Tenderness





- Extremities Exam


Extremities exam: Positive for: normal inspection





- Back Exam


Back exam: NORMAL INSPECTION





- Neurological Exam


Neurological exam: Alert, Reflexes Normal





- Psychiatric Exam


Psychiatric exam: Flat Affect, Normal Mood





- Skin


Skin Exam: Dry, Intact, Normal Color, Warm





Results





- Vital Signs


Recent Vital Signs: 


 Last Vital Signs











Temp  98.2 F   04/11/18 20:00


 


Pulse  79   04/11/18 20:00


 


Resp  19   04/11/18 20:00


 


BP  140/68   04/11/18 20:00


 


Pulse Ox  97   04/11/18 20:00














- Labs


Result Diagrams: 


 04/11/18 04:45





 04/11/18 04:45


Labs: 


 Laboratory Results - last 24 hr











  04/11/18 04/11/18





  04:45 04:45


 


WBC  6.9 


 


RBC  4.79 


 


Hgb  13.6 


 


Hct  41.7 


 


MCV  87.1 


 


MCH  28.4 


 


MCHC  32.6 L 


 


RDW  13.7 


 


Plt Count  124 L 


 


Sodium   146


 


Potassium   3.3 L


 


Chloride   104


 


Carbon Dioxide   26


 


Anion Gap   19


 


BUN   17


 


Creatinine   0.9


 


Est GFR ( Amer)   > 60


 


Est GFR (Non-Af Amer)   > 60


 


Random Glucose   98


 


Calcium   9.0


 


Total Bilirubin   1.2


 


AST   24


 


ALT   32


 


Alkaline Phosphatase   53


 


Total Protein   7.1


 


Albumin   3.9


 


Globulin   3.3


 


Albumin/Globulin Ratio   1.2














Assessment & Plan


(1) CVA (cerebral vascular accident)


Assessment and Plan: 


echo to evaluation for R/L shunting


asa 


MRI brain


telemetry 


Status: Acute   





(2) ICH (intracerebral hemorrhage)


Status: Acute   





(3) HTN (hypertension)


Assessment and Plan: 


valsartan 


Status: Chronic

## 2018-04-11 NOTE — CARD
--------------- APPROVED REPORT --------------





EKG Measurement

Heart [id number]

[id number]

[id number]

[id number]

[id number]



<Conclusion>

Sinus bradycardia

T wave abnormality, consider anterolateral ischemia

Abnormal ECG

## 2018-04-12 LAB
BUN SERPL-MCNC: 26 MG/DL (ref 9–20)
CALCIUM SERPL-MCNC: 9.4 MG/DL (ref 8.4–10.2)
ERYTHROCYTE [DISTWIDTH] IN BLOOD BY AUTOMATED COUNT: 13.7 % (ref 11.5–14.5)
GFR NON-AFRICAN AMERICAN: > 60
HGB BLD-MCNC: 13.9 G/DL (ref 12–18)
MCH RBC QN AUTO: 28.8 PG (ref 27–31)
MCHC RBC AUTO-ENTMCNC: 33.1 G/DL (ref 33–37)
MCV RBC AUTO: 87.1 FL (ref 80–94)
PLATELET # BLD: 124 K/UL (ref 130–400)
RBC # BLD AUTO: 4.81 MIL/UL (ref 4.4–5.9)
WBC # BLD AUTO: 7.2 K/UL (ref 4.8–10.8)

## 2018-04-12 NOTE — CP.PCM.PN
Subjective





- Date & Time of Evaluation


Date of Evaluation: 04/12/18


Time of Evaluation: 13:52





- Subjective


Subjective: 


no events overnight


s/p MRI / MRA 





Objective





- Vital Signs/Intake and Output


Vital Signs (last 24 hours): 


 











Temp Pulse Resp BP Pulse Ox


 


 97.9 F   76   23   136/95 H  100 


 


 04/12/18 12:00  04/12/18 13:00  04/12/18 13:00  04/12/18 13:00  04/12/18 13:00








Intake and Output: 


 











 04/12/18 04/12/18





 06:59 18:59


 


Intake Total 360 


 


Output Total 750 


 


Balance -390 














- Medications


Medications: 


 Current Medications





Pantoprazole Sodium (Protonix Inj)  40 mg IVP DAILY [facility]


   Last Admin: 04/12/18 08:23 Dose:  40 mg


Valsartan (Diovan)  160 mg PO DAILY [facility]


   Last Admin: 04/12/18 08:23 Dose:  160 mg











- Labs


Labs: 


 





 04/12/18 04:45 





 04/12/18 04:45 





 











PT  12.7 Seconds (9.8-13.1)   04/10/18  12:22    


 


INR  1.1  (0.9-1.2)   04/10/18  12:22    


 


APTT  30.8 Seconds (25.6-37.1)   04/10/18  12:22    














- Constitutional


Appears: Well





- Head Exam


Head Exam: ATRAUMATIC, NORMAL INSPECTION, NORMOCEPHALIC





- Eye Exam


Eye Exam: EOMI, Normal appearance, PERRL


Pupil Exam: NORMAL ACCOMODATION, PERRL





- ENT Exam


ENT Exam: Mucous Membranes Moist, Normal Exam





- Neck Exam


Neck Exam: Full ROM, Normal Inspection.  absent: Lymphadenopathy





- Respiratory Exam


Respiratory Exam: Clear to Ausculation Bilateral, NORMAL BREATHING PATTERN





- Cardiovascular Exam


Cardiovascular Exam: REGULAR RHYTHM, +S1, +S2, Murmur





- GI/Abdominal Exam


GI & Abdominal Exam: Soft, Normal Bowel Sounds.  absent: Tenderness





- Extremities Exam


Extremities Exam: Full ROM, Normal Capillary Refill, Normal Inspection.  absent

: Joint Swelling, Pedal Edema





- Back Exam


Back Exam: NORMAL INSPECTION





- Neurological Exam


Neurological Exam: Alert, Awake





- Psychiatric Exam


Psychiatric exam: Normal Affect, Normal Mood





- Skin


Skin Exam: Dry, Intact, Normal Color, Warm





Assessment and Plan


(1) CVA (cerebral vascular accident)


Assessment & Plan: 


2' to cavernous hemangiomas on MRI 


avoid antiplatelet and AC 2' to ICH 


low dose statins


no further w/u warranted 


echo reviewed - no bubble crossover 


Status: Acute   





(2) ICH (intracerebral hemorrhage)


Status: Acute   





(3) HTN (hypertension)


Assessment & Plan: 


cont valsartan





Status: Chronic

## 2018-04-12 NOTE — CP.PCM.PN
Subjective





- Date & Time of Evaluation


Date of Evaluation: 04/12/18


Time of Evaluation: 09:00





- Subjective


Subjective: 





Patient seen and examined at bedside this morning w/ Dr. Guerrier.  There are no 

acute events overnight, NAD.  Patient denies headache, chest pain, SOB, change 

in speech, weakness, or numbness.  Mother is at bedside.  Patient seen by 

cardiologist.





Objective





- Vital Signs/Intake and Output


Vital Signs (last 24 hours): 


 











Temp Pulse Resp BP Pulse Ox


 


 97.9 F   81   26 H  138/95 H  100 


 


 04/12/18 12:00  04/12/18 14:00  04/12/18 14:00  04/12/18 14:00  04/12/18 14:00








Intake and Output: 


 











 04/12/18 04/12/18





 06:59 18:59


 


Intake Total 360 


 


Output Total 750 


 


Balance -390 














- Medications


Medications: 


 Current Medications





Pantoprazole Sodium (Protonix Inj)  40 mg IVP DAILY [facility]


   Last Admin: 04/12/18 08:23 Dose:  40 mg


Valsartan (Diovan)  160 mg PO DAILY [facility]


   Last Admin: 04/12/18 08:23 Dose:  160 mg











- Labs


Labs: 


 





 04/12/18 04:45 





 04/12/18 04:45 





 











PT  12.7 Seconds (9.8-13.1)   04/10/18  12:22    


 


INR  1.1  (0.9-1.2)   04/10/18  12:22    


 


APTT  30.8 Seconds (25.6-37.1)   04/10/18  12:22    














- Constitutional


Appears: Non-toxic, No Acute Distress





- Head Exam


Head Exam: ATRAUMATIC, NORMAL INSPECTION, NORMOCEPHALIC





- Eye Exam


Eye Exam: EOMI, Normal appearance


Pupil Exam: NORMAL ACCOMODATION, PERRL





- ENT Exam


ENT Exam: Mucous Membranes Moist





- Neck Exam


Neck Exam: Full ROM.  absent: Tenderness





- Respiratory Exam


Respiratory Exam: Clear to Ausculation Bilateral.  absent: Accessory Muscle Use

, Decreased Breath Sounds, Rales, Rhonchi, Wheezes, Respiratory Distress





- Cardiovascular Exam


Cardiovascular Exam: REGULAR RHYTHM.  absent: Tachycardia





- GI/Abdominal Exam


GI & Abdominal Exam: Soft, Normal Bowel Sounds.  absent: Distended, Tenderness





- Extremities Exam


Extremities Exam: absent: Calf Tenderness, Pedal Edema





- Neurological Exam


Neurological Exam: Alert, Awake, CN II-XII Intact, Oriented x3





- Skin


Skin Exam: Dry, Intact, Normal Color, Warm





Assessment and Plan


(1) ICH (intracerebral hemorrhage)


Status: Acute   





(2) HTN (hypertension)


Status: Chronic   





- Assessment and Plan (Free Text)


Plan: 





c/w present management


afebrile, non-tachycardic, normotensive


neurology recommendations appreciated


neuro-surgery recommendations appreciated


cardiology recommendations appreciated


EKG: sinus bradycardia, T wave depression/flattenning on anterolateral leads


CT head w/o 04/10/2018: showed left basal ganglia intracranial hemorrhage w/ 

chronic lacunar infarct


repeat CT head w/o: stable left basal ganglia intracranial hemorrhage


MRA head w/o contrast: intact [place], there is hypoplastic distal 

right vertebral artery with widely patent ectatic distal left vertebral artery


MRI brain: showed cavernous hemangioma 


echo w/ bubble study: EF 65-70% no bubble crossover


speech/swallow eval: may start PO w/ aspiration precautions, may benefit w/ 

speech therapy


c/w PT/OT 


monitor for acute changes


dispo pending acute rehab

## 2018-04-12 NOTE — CARD
--------------- APPROVED REPORT --------------





EXAM: Two-dimensional and M-mode echocardiogram with Doppler and 

color Doppler.



Other Information 

Quality : AverageRhythm : NSR



INDICATION

CVA/TIA 



Echo Enhancing Agent

Indication: Rule Out Septal Defect

Agent/Amount Used: Agitated Saline



2D DIMENSIONS 

IVSd0.89   (0.7-1.1cm)LVDd3.63   (3.9-5.9cm)

LVOT Diameter3.63   (1.8-2.4cm)PWd0.89   (0.7-1.1cm)

IVSs1.96   (0.8-1.2cm)LVDs2.43   (2.5-4.0cm)

FS (%) 33.0   %PWs1.96   (0.8-1.2cm)



M-Mode DIMENSIONS 

Left Atrium (MM)3.56   (2.5-4.0cm)IVSd1.09   (0.7-1.1cm)

Aortic Root4.50   (2.2-3.7cm)LVDd6.75   (4.0-5.6cm)

Aortic Cusp Exc.2.19   (1.5-2.0cm)PWd1.13   (0.7-1.1cm)

IVSs2.00   cmFS (%) 53   %

LVDs3.16   (2.0-3.8cm)PWs1.72   cm



Mitral Valve

MV E Ebmjjoik05.5cm/sMV DECEL [id number] A Gxddqnws81.4cm/s

MV VNY73urX/A ratio1.2MVA (PHT)3.17cm2



TDI

Lateral E' Peak V10.43cm/sMedial E' Peak V8.72cm/sE/Lateral E'6.0

E/Medial E'7.2



Pulmonary Valve

PV Peak Ypraujmm768.0cm/s



 LEFT VENTRICLE 

The left ventricle is normal size.

There is normal left ventricular wall thickness.

Left ventricle systolic function is normal.

The Ejection Fraction is 65-70%.

The basal 1/3rd of the inferior wall was hypokinetic

Remaining LV wall segments contracted normally

Transmitral Doppler flow pattern is Grade I-abnormal relaxation 

pattern.



 RIGHT VENTRICLE 

The right ventricle is normal size.

There is normal right ventricular wall thickness.

The right ventricular systolic function is normal.



 ATRIA 

The left atrium size is normal.

The right atrium size is normal.



 AORTIC VALVE 

The aortic valve is normal in structure.

No aortic regurgitation is present.

There is no aortic valvular stenosis. 



 MITRAL VALVE 

The mitral valve is normal in structure.

There is no evidence of mitral valve prolapse.

There is no mitral valve stenosis.

There is no mitral valve regurgitation noted.



 TRICUSPID VALVE 

The tricuspid valve is normal in structure.

There is no tricuspid valve regurgitation noted.



 PULMONIC VALVE 

The pulmonary valve is normal in structure.

There is no pulmonic valvular regurgitation. 



 GREAT VESSELS 

The aortic root is mildly to moderately enlarged.

The IVC is normal in size and collapses >50% with inspiration.



 PERICARDIAL EFFUSION 

The pericardium appears normal.



<Conclusion>

The left ventricle is normal size.

There is normal left ventricular wall thickness.

The basal 1/3rd of the inferior wall was hypokinetic

Remaining LV wall segments contracted normally

Left ventricle systolic function is normal.

The Ejection Fraction is 65-70%.

Transmitral Doppler flow pattern is Grade I-abnormal relaxation 

pattern.

The aortic root is mildly to moderately enlarged.

## 2018-04-13 VITALS — RESPIRATION RATE: 20 BRPM

## 2018-04-13 VITALS
TEMPERATURE: 97.9 F | OXYGEN SATURATION: 100 % | DIASTOLIC BLOOD PRESSURE: 85 MMHG | HEART RATE: 68 BPM | SYSTOLIC BLOOD PRESSURE: 149 MMHG

## 2018-04-13 LAB
BUN SERPL-MCNC: 26 MG/DL (ref 9–20)
CALCIUM SERPL-MCNC: 8.9 MG/DL (ref 8.4–10.2)
ERYTHROCYTE [DISTWIDTH] IN BLOOD BY AUTOMATED COUNT: 13.7 % (ref 11.5–14.5)
GFR NON-AFRICAN AMERICAN: > 60
HGB BLD-MCNC: 13.2 G/DL (ref 12–18)
MCH RBC QN AUTO: 28.3 PG (ref 27–31)
MCHC RBC AUTO-ENTMCNC: 32.2 G/DL (ref 33–37)
MCV RBC AUTO: 87.9 FL (ref 80–94)
PLATELET # BLD: 126 K/UL (ref 130–400)
RBC # BLD AUTO: 4.65 MIL/UL (ref 4.4–5.9)
WBC # BLD AUTO: 7.1 K/UL (ref 4.8–10.8)

## 2018-04-13 NOTE — CP.PCM.PCO
Assessment & Plan





- Assessment and Plan (Free Text)


Assessment: 





Patient seen and examined. 


Vital signs stable, denies chest pain, shortness of breath nausea vomiting. 


Still with some dysarthria, walked >250 feet with physical therapy.  


Spoke to Neurology, cleared for dc on bp meds, no anticoagulants.  


Rx for outpatient speech therapy.  


Spoke to [facility] ortiz for disposition


All the above discussed with NP Aster covering Dr Marie who agrees with plan.

## 2018-04-14 NOTE — CP.PCM.PN
Subjective





- Date & Time of Evaluation


Date of Evaluation: 04/13/18


Time of Evaluation: 11:00





- Subjective


Subjective: 


here are no acute events overnight, NAD.  Patient denies headache, chest pain, 

SOB, change in speech, weakness, or numbness.  Mother is at bedside.  Patient 

seen by cardiologist.











Objective





- Vital Signs/Intake and Output


Vital Signs (last 24 hours): 


 











Temp Pulse Resp BP Pulse Ox


 


 97.9 F   68   20   149/85   100 


 


 04/13/18 12:00  04/13/18 12:00  04/13/18 12:00  04/13/18 12:00  04/13/18 12:00











- Labs


Labs: 


 





 04/13/18 05:56 





 04/13/18 08:21 





 











PT  12.7 Seconds (9.8-13.1)   04/10/18  12:22    


 


INR  1.1  (0.9-1.2)   04/10/18  12:22    


 


APTT  30.8 Seconds (25.6-37.1)   04/10/18  12:22    














- Constitutional


Appears: Well, No Acute Distress





- Head Exam


Head Exam: ATRAUMATIC, NORMAL INSPECTION





- Eye Exam


Eye Exam: Normal appearance


Pupil Exam: NORMAL ACCOMODATION





- ENT Exam


ENT Exam: Mucous Membranes Moist





- Neck Exam


Neck Exam: Full ROM, Normal Inspection





- Respiratory Exam


Respiratory Exam: Clear to Ausculation Bilateral, NORMAL BREATHING PATTERN





- Cardiovascular Exam


Cardiovascular Exam: REGULAR RHYTHM





- GI/Abdominal Exam


GI & Abdominal Exam: Normal Bowel Sounds





- Extremities Exam


Extremities Exam: Full ROM, Normal Inspection





- Back Exam


Back Exam: NORMAL INSPECTION





- Neurological Exam


Neurological Exam: Alert, Awake, Oriented x3


Neuro motor strength exam: Left Upper Extremity: 5, Right Upper Extremity: 4, 

Left Lower Extremity: 5, Right Lower Extremity: 4





- Psychiatric Exam


Psychiatric exam: Normal Affect





- Skin


Skin Exam: Normal Color, Warm





Assessment and Plan





- Assessment and Plan (Free Text)


Assessment: 





(1) ICH (intracerebral hemorrhage)


Status: Acute   


(2) HTN (hypertension)


Status: Chronic  


Plan: 


c/w present management


afebrile, non-tachycardic, normotensive


neurology recommendations appreciated


neuro-surgery recommendations appreciated


cardiology recommendations appreciated


EKG: sinus bradycardia, T wave depression/flattenning on anterolateral leads


CT head w/o 04/10/2018: showed left basal ganglia intracranial hemorrhage w/ 

chronic lacunar infarct


repeat CT head w/o: stable left basal ganglia intracranial hemorrhage


MRA head w/o contrast: intact [place], there is hypoplastic distal 

right vertebral artery with widely patent ectatic distal left vertebral artery


MRI brain: showed cavernous hemangioma 


echo w/ bubble study: EF 65-70% no bubble crossover


speech/swallow eval: may start PO w/ aspiration precautions, may benefit w/ 

speech therapy


c/w PT/OT 


monitor for acute changes


acute rehab vs home

## 2022-09-07 NOTE — CON
DATE:  04/10/2018



NEUROLOGY CONSULTATION



CHIEF COMPLAINT:  Dysarthria.



HISTORY OF PRESENT ILLNESS:  This is a 58-year-old man with a history of

hypertension and gastritis who presented to the hospital with difficulty

with speech for the past 1 week.  The patient did not think much of the

symptoms, denies any headaches or any focal weakness, but had some gait

problems and came in for further evaluation.  His blood pressure initially

was 171/101, which was elevated systolic and diastolic.  He had a CAT scan

of the head, which showed a small intraparenchymal hemorrhage in the left

basal ganglia with some limited edema, which overall neurosurgery felt

there is no neurosurgical intervention.  There is an old chronic lobar

infarction in the right frontal lobe as well and old lacunar infarct in the

left thalamus.  He has also right-sided weakness, otherwise, no major focal

deficits.  Now he has some dysarthria present.



PAST MEDICAL HISTORY:  Hypertension, gastritis, and schizophrenia.



MEDICATIONS:  Reviewed by nurse per reconciliation sheet.



FAMILY HISTORY:  Noncontributory.



ALLERGIES:  NO KNOWN DRUG ALLERGIES.



REVIEW OF SYSTEMS:  A 14-point review of systems is negative except in the

HPI.



PHYSICAL EXAMINATION:

GENERAL:  The patient is seen up in bed, in no acute distress.

VITAL SIGNS:  Temperature 97, pulse rate 67, blood pressure 160/104,

respiratory rate 19, and oxygen saturation 98% on room air.

HEENT:  Atraumatic and normocephalic.  PERRLA.  Extraocular movements

intact.

NECK:  Supple.  No JVD.  No adenopathy noted.

CARDIOPULMONARY:  S1 and S2.  Normal rate and rhythm.  No murmurs, rubs or

gallops.

LUNGS:  Clear to auscultation.  No adventitious sounds.

ABDOMEN:  Soft, nontender, and nondistended.  Bowel sounds are present.

EXTREMITIES:  No clubbing.  No cyanosis.  Peripheral pulses 2+ bilaterally.

NEUROLOGIC:  The patient is alert and oriented to person, place, month, and

year.  Speech is dysarthric.  On cranial nerve testing, there is mild right

facial droop.  Otherwise, rest of cranial nerves normal.  Motor exam:  Mild

right-sided weakness with mild right pronator drift compared to the left. 

The left side is intact.  Sensory:  Light touch, pinprick, proprioception,

and vibration are intact.  DTRs are 2+ throughout.  Coordination: 

Finger-to-nose intact.  There is some mild dysmetria on the finger-to-nose

on the right due to mild right-sided weakness.  Gait is deferred for now.





LABORATORY DATA:  Sodium is 142, potassium is 3.8, chloride is 98, carbon

dioxide 30, BUN of 15, creatinine 1.1, and random glucose of 101.  U-tox is

negative.



ASSESSMENT AND PLAN:  This is a 58-year-old man with a history of

schizophrenia and history of hypertension who came in with dysarthria for

the past 1 week with some mild right-sided weakness and right facial droop,

found to have elevated systolic and diastolic blood pressures of 171/101

and was given _____ in the ER.  He had a CAT scan, which showed a small

intraparenchymal hemorrhage in the left basal ganglia with locally an edema

with no surgical intervention by neurosurgery recommendations.  At this

time, intraparenchymal hemorrhage on the left basal ganglia, which is

intraparenchymal, is secondary to hypertensive emergency.  At this time,

recommend:

1.  Keep blood pressure between 120s to 130s systolic and diastolic 70s to

80s, possibly place on nicardipine drip and monitor in the ICU for 24

hours.

2.  No antiplatelet for 3 weeks in the onset of hemorrhage and can start

baby aspirin 81 mg in 3 weeks.

3.  Get MRI of the brain and MRA of the head.

4.  PT and OT assessment and continue on current present medical

management.



Thank you for this consult.





__________________________________________

Jef Branch MD





DD:  04/10/2018 16:52:05

DT:  04/10/2018 17:49:51

Job # [id number] Ultrasound left groin, left testicle.   Nothing to eat or drink until we discuss your results.   Do not take any anti-inflammatories including aspirin, ibuprofen, Motrin, Advil, Aleve, naproxen until we discuss your results.     If ultrasound is negative, further testing with lab, imaging may be required- we can discuss when I have your results.